# Patient Record
Sex: MALE | Race: WHITE | NOT HISPANIC OR LATINO | Employment: FULL TIME | URBAN - METROPOLITAN AREA
[De-identification: names, ages, dates, MRNs, and addresses within clinical notes are randomized per-mention and may not be internally consistent; named-entity substitution may affect disease eponyms.]

---

## 2022-11-22 ENCOUNTER — HOSPITAL ENCOUNTER (EMERGENCY)
Facility: HOSPITAL | Age: 37
Discharge: HOME | End: 2022-11-22
Attending: EMERGENCY MEDICINE

## 2022-11-22 VITALS
BODY MASS INDEX: 24.52 KG/M2 | HEART RATE: 100 BPM | OXYGEN SATURATION: 98 % | RESPIRATION RATE: 17 BRPM | WEIGHT: 185 LBS | DIASTOLIC BLOOD PRESSURE: 93 MMHG | HEIGHT: 73 IN | TEMPERATURE: 98.5 F | SYSTOLIC BLOOD PRESSURE: 143 MMHG

## 2022-11-22 DIAGNOSIS — R11.10 VOMITING: ICD-10-CM

## 2022-11-22 DIAGNOSIS — U07.1 COVID-19: ICD-10-CM

## 2022-11-22 DIAGNOSIS — J06.9 VIRAL URI WITH COUGH: Primary | ICD-10-CM

## 2022-11-22 LAB
FLUAV RNA RESP QL NAA+PROBE: NEGATIVE
FLUBV RNA RESP QL NAA+PROBE: NEGATIVE
RSV RNA RESP QL NAA+PROBE: NEGATIVE
SARS-COV-2 RNA RESP QL NAA+PROBE: POSITIVE

## 2022-11-22 RX ORDER — ONDANSETRON 4 MG/1
4 TABLET, ORALLY DISINTEGRATING ORAL ONCE
Status: COMPLETED | OUTPATIENT
Start: 2022-11-22 | End: 2022-11-22

## 2022-11-22 RX ORDER — IBUPROFEN 600 MG/1
600 TABLET ORAL ONCE
Status: COMPLETED | OUTPATIENT
Start: 2022-11-22 | End: 2022-11-22

## 2022-11-22 RX ADMIN — IBUPROFEN 600 MG: 600 TABLET, FILM COATED ORAL at 14:09

## 2022-11-22 RX ADMIN — ONDANSETRON 4 MG: 4 TABLET, ORALLY DISINTEGRATING ORAL at 14:10

## 2022-11-22 NOTE — ED PROVIDER NOTES
History  Chief Complaint   Patient presents with   • Cough     Reports dry heaving and coughing since last night  No meds taken  PMH: right inguinal hernia  no PSH  Presents to ED c/o 6 day h/o generalized malaise, myalgias, dry cough, congestion  Pt states sx started 11/17, 5 days ago and took off 17/18, and slept most of the weekend; then tried to return to work yesterday and had worsening coughing, non-bloody vomiting, tactile fever, chills  Today feels better, did tolerate some foods, ice cream, but wanted to be checked bc he missed several days of work  No urinary complaints, abd pain, flank pain,no ear pain, sore throat  NO meds PTA            None       History reviewed  No pertinent past medical history  History reviewed  No pertinent surgical history  History reviewed  No pertinent family history  I have reviewed and agree with the history as documented  E-Cigarette/Vaping   • E-Cigarette Use Never User      E-Cigarette/Vaping Substances     Social History     Tobacco Use   • Smoking status: Never   • Smokeless tobacco: Never   Vaping Use   • Vaping Use: Never used   Substance Use Topics   • Alcohol use: Yes     Alcohol/week: 2 0 standard drinks     Types: 2 Cans of beer per week   • Drug use: Yes     Types: Marijuana       Review of Systems   Constitutional: Positive for chills  Negative for fever  HENT: Positive for congestion and rhinorrhea  Negative for hearing loss, nosebleeds, sore throat and trouble swallowing  Eyes: Negative for visual disturbance  Respiratory: Positive for cough  Negative for shortness of breath  Cardiovascular: Negative for chest pain and leg swelling  Gastrointestinal: Positive for nausea and vomiting  Negative for abdominal pain  Genitourinary: Negative for dysuria and frequency  Musculoskeletal: Positive for myalgias  Negative for arthralgias  Skin: Negative for rash  Neurological: Positive for headaches   Negative for dizziness and weakness  Psychiatric/Behavioral: Negative for behavioral problems  All other systems reviewed and are negative  Physical Exam  Physical Exam  Vitals and nursing note reviewed  Constitutional:       General: He is not in acute distress  Appearance: Normal appearance  He is well-developed  HENT:      Head: Normocephalic and atraumatic  Right Ear: Tympanic membrane, ear canal and external ear normal       Left Ear: Tympanic membrane, ear canal and external ear normal       Nose: Congestion and rhinorrhea (Clear) present  Mouth/Throat:      Mouth: Mucous membranes are moist       Pharynx: Oropharynx is clear  No oropharyngeal exudate or posterior oropharyngeal erythema  Eyes:      Conjunctiva/sclera: Conjunctivae normal    Cardiovascular:      Rate and Rhythm: Normal rate and regular rhythm  Pulmonary:      Effort: Pulmonary effort is normal  No respiratory distress  Breath sounds: Normal breath sounds  No wheezing or rhonchi  Abdominal:      General: Bowel sounds are normal  There is no distension  Palpations: Abdomen is soft  Tenderness: There is no abdominal tenderness  Hernia: A hernia (non tender right inguinal) is present  Genitourinary:     Penis: Normal        Testes: Normal    Musculoskeletal:         General: Normal range of motion  Cervical back: Normal range of motion and neck supple  No tenderness  Right lower leg: No edema  Left lower leg: No edema  Lymphadenopathy:      Cervical: No cervical adenopathy  Skin:     General: Skin is warm and dry  Capillary Refill: Capillary refill takes less than 2 seconds  Findings: No rash  Neurological:      Mental Status: He is alert  Motor: No weakness     Psychiatric:         Behavior: Behavior normal          Vital Signs  ED Triage Vitals   Temperature Pulse Respirations Blood Pressure SpO2   11/22/22 1327 11/22/22 1326 11/22/22 1326 11/22/22 1326 11/22/22 1326   98 5 °F (36 9 °C) 100 17 143/93 98 %      Temp Source Heart Rate Source Patient Position - Orthostatic VS BP Location FiO2 (%)   11/22/22 1327 -- -- -- --   Oral          Pain Score       --                  Vitals:    11/22/22 1326   BP: 143/93   Pulse: 100         Visual Acuity      ED Medications  Medications   ondansetron (ZOFRAN-ODT) dispersible tablet 4 mg (4 mg Oral Given 11/22/22 1410)   ibuprofen (MOTRIN) tablet 600 mg (600 mg Oral Given 11/22/22 1409)       Diagnostic Studies  Results Reviewed     Procedure Component Value Units Date/Time    FLU/RSV/COVID - if FLU/RSV clinically relevant [[id number]]  (Abnormal) Collected: 11/22/22 1413    Lab Status: Final result Specimen: Nares from Nose Updated: 11/22/22 1506     SARS-CoV-2 Positive     INFLUENZA A PCR Negative     INFLUENZA B PCR Negative     RSV PCR Negative    Narrative:      FOR PEDIATRIC PATIENTS - copy/paste COVID Guidelines URL to browser: [email or web address]  [employer]    SARS-CoV-2 assay is a Nucleic Acid Amplification assay intended for the  qualitative detection of nucleic acid from SARS-CoV-2 in nasopharyngeal  swabs  Results are for the presumptive identification of SARS-CoV-2 RNA  Positive results are indicative of infection with SARS-CoV-2, the virus  causing COVID-19, but do not rule out bacterial infection or co-infection  with other viruses  Laboratories within the [place] and its  territories are required to report all positive results to the appropriate  public health authorities  Negative results do not preclude SARS-CoV-2  infection and should not be used as the sole basis for treatment or other  patient management decisions  Negative results must be combined with  clinical observations, patient history, and epidemiological information  This test has not been FDA cleared or approved  This test has been authorized by FDA under an Emergency Use Authorization  (EUA)   This test is only authorized for the duration of time the  declaration that circumstances exist justifying the authorization of the  emergency use of an in vitro diagnostic tests for detection of SARS-CoV-2  virus and/or diagnosis of COVID-19 infection under section 564(b)(1) of  the Act, 21 U  S C  880VIB-0(G)(5), unless the authorization is terminated  or revoked sooner  The test has been validated but independent review by FDA  and CLIA is pending  Test performed using [employer] Gene[employer]pert: This RT-PCR assay targets N2,  a region unique to SARS-CoV-2  A conserved region in the E-gene was chosen  for pan-Sarbecovirus detection which includes SARS-CoV-2  According to CMS-2020-01-R, this platform meets the definition of high-throughput technology  No orders to display              Procedures  Procedures         ED Course                                             MDM    Disposition  Final diagnoses:   Viral URI with cough   Vomiting   COVID-19     Time reflects when diagnosis was documented in both MDM as applicable and the Disposition within this note     Time User Action Codes Description Comment    11/22/2022  1:52 PM Sánchez Costa [J06 9] Viral URI with cough     11/22/2022  1:52 PM Ramon Quintana [R11 10] Vomiting     11/22/2022  3:47 PM Jenn Arias [address]  [U07 1] COVID-19       ED Disposition     ED Disposition   Discharge    Condition   Stable    Date/Time   Tue Nov 22, 2022  1:53 PM    Comment   Pearl Tello  discharge to home/self care  Follow-up Information     Follow up With Specialties Details Why Contact Info    Your PCP              There are no discharge medications for this patient  No discharge procedures on file      PDMP Review     None          ED Provider  Electronically Signed by           Jamie Dee PA-C  11/22/22 5204

## 2022-11-22 NOTE — Clinical Note
Almeta Bosworth was seen and treated in our emergency department on 11/22/2022  Diagnosis:     Memo Saldaña  may return to work on return date  He may return on this date: 11/23/2022    Pt tested for viral panel (COVID/RSV/Flu), pending at this time, but symptoms have been for 5 days, no fever here in ED, able to return to work     If you have any questions or concerns, please don't hesitate to call        Josie Patton PA-C    ______________________________           _______________          _______________  Hospital Representative                              Date                                Time

## 2022-11-22 NOTE — DISCHARGE INSTRUCTIONS
Use Tylenol every 4 hours or Motrin every 6 hours; you can alternate the 2 medications taking something every 3 hours for pain or fever  You can use over-the-counter antihistamines like Claritin, Zyrtec, Allegra with Flonase for nasal congestion symptoms  If no improvement follow-up with your doctor in next few days

## 2024-03-26 ENCOUNTER — HOSPITAL ENCOUNTER (EMERGENCY)
Facility: HOSPITAL | Age: 39
End: 2024-03-28
Attending: EMERGENCY MEDICINE | Admitting: EMERGENCY MEDICINE
Payer: COMMERCIAL

## 2024-03-26 DIAGNOSIS — F29 PSYCHOSIS (HCC): Primary | ICD-10-CM

## 2024-03-26 DIAGNOSIS — F22 DELUSIONS (HCC): ICD-10-CM

## 2024-03-26 DIAGNOSIS — F15.10 METHAMPHETAMINE USE (HCC): ICD-10-CM

## 2024-03-26 DIAGNOSIS — R46.89 AGGRESSIVE BEHAVIOR: ICD-10-CM

## 2024-03-26 PROCEDURE — 99285 EMERGENCY DEPT VISIT HI MDM: CPT | Performed by: EMERGENCY MEDICINE

## 2024-03-26 PROCEDURE — 96372 THER/PROPH/DIAG INJ SC/IM: CPT

## 2024-03-26 PROCEDURE — 99285 EMERGENCY DEPT VISIT HI MDM: CPT

## 2024-03-26 PROCEDURE — 82075 ASSAY OF BREATH ETHANOL: CPT | Performed by: EMERGENCY MEDICINE

## 2024-03-26 RX ORDER — OLANZAPINE 10 MG/2ML
INJECTION, POWDER, FOR SOLUTION INTRAMUSCULAR
Status: COMPLETED
Start: 2024-03-26 | End: 2024-03-26

## 2024-03-26 RX ORDER — LORAZEPAM 2 MG/ML
INJECTION INTRAMUSCULAR
Status: COMPLETED
Start: 2024-03-26 | End: 2024-03-26

## 2024-03-26 RX ORDER — WATER 10 ML/10ML
INJECTION INTRAMUSCULAR; INTRAVENOUS; SUBCUTANEOUS
Status: DISPENSED
Start: 2024-03-26 | End: 2024-03-27

## 2024-03-26 RX ORDER — OLANZAPINE 10 MG/2ML
10 INJECTION, POWDER, FOR SOLUTION INTRAMUSCULAR ONCE
Status: COMPLETED | OUTPATIENT
Start: 2024-03-26 | End: 2024-03-26

## 2024-03-26 RX ORDER — LORAZEPAM 2 MG/ML
2 INJECTION INTRAMUSCULAR ONCE
Status: COMPLETED | OUTPATIENT
Start: 2024-03-26 | End: 2024-03-26

## 2024-03-26 RX ADMIN — LORAZEPAM 2 MG: 2 INJECTION INTRAMUSCULAR at 16:39

## 2024-03-26 RX ADMIN — OLANZAPINE 10 MG: 10 INJECTION, POWDER, FOR SOLUTION INTRAMUSCULAR at 16:40

## 2024-03-26 RX ADMIN — LORAZEPAM 2 MG: 2 INJECTION INTRAMUSCULAR; INTRAVENOUS at 16:39
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
## 2024-03-26 NOTE — RESTRAINT FACE TO FACE
Restraint Face to Face   Karel Hairston Jr. 38 y.o. male MRN: [id number]  Unit/Bed#: SH 01 Encounter: [id number]      Physical Evaluation - Patient screaming at staff.  Not answering questions.  Accusing others of following him and threatening violence   Purpose for Restraints/ Seclusion High risk for causing significant disruption of treatment environment  and High risk for harm to others  Patient's reaction to the intervention - no improvement at this time.  Will continue to reassess  Patient's medical condition stable  Patient's Behavioral condition paranoid and aggressive  Restraints to be Continued

## 2024-03-27 VITALS
SYSTOLIC BLOOD PRESSURE: 140 MMHG | RESPIRATION RATE: 18 BRPM | TEMPERATURE: 97.8 F | OXYGEN SATURATION: 98 % | DIASTOLIC BLOOD PRESSURE: 80 MMHG | HEART RATE: 93 BPM

## 2024-03-27 LAB
AMPHETAMINES SERPL QL SCN: NEGATIVE
BARBITURATES UR QL: NEGATIVE
BENZODIAZ UR QL: NEGATIVE
COCAINE UR QL: NEGATIVE
ETHANOL EXG-MCNC: 0 MG/DL
FLUAV RNA RESP QL NAA+PROBE: NEGATIVE
FLUBV RNA RESP QL NAA+PROBE: NEGATIVE
METHADONE UR QL: NEGATIVE
OPIATES UR QL SCN: NEGATIVE
OXYCODONE+OXYMORPHONE UR QL SCN: NEGATIVE
PCP UR QL: NEGATIVE
RSV RNA RESP QL NAA+PROBE: NEGATIVE
SARS-COV-2 RNA RESP QL NAA+PROBE: NEGATIVE
THC UR QL: POSITIVE

## 2024-03-27 PROCEDURE — 0241U HB NFCT DS VIR RESP RNA 4 TRGT: CPT | Performed by: PHYSICIAN ASSISTANT

## 2024-03-27 PROCEDURE — 80307 DRUG TEST PRSMV CHEM ANLYZR: CPT | Performed by: EMERGENCY MEDICINE

## 2024-03-27 PROCEDURE — 96372 THER/PROPH/DIAG INJ SC/IM: CPT

## 2024-03-27 RX ORDER — WATER 10 ML/10ML
INJECTION INTRAMUSCULAR; INTRAVENOUS; SUBCUTANEOUS
Status: DISPENSED
Start: 2024-03-27 | End: 2024-03-27

## 2024-03-27 RX ORDER — OLANZAPINE 10 MG/2ML
10 INJECTION, POWDER, FOR SOLUTION INTRAMUSCULAR ONCE
Status: COMPLETED | OUTPATIENT
Start: 2024-03-27 | End: 2024-03-27

## 2024-03-27 RX ORDER — LORAZEPAM 2 MG/ML
2 INJECTION INTRAMUSCULAR ONCE
Status: COMPLETED | OUTPATIENT
Start: 2024-03-27 | End: 2024-03-27

## 2024-03-27 RX ADMIN — LORAZEPAM 2 MG: 2 INJECTION INTRAMUSCULAR; INTRAVENOUS at 00:48

## 2024-03-27 RX ADMIN — OLANZAPINE 10 MG: 10 INJECTION, POWDER, FOR SOLUTION INTRAMUSCULAR at 00:48
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
## 2024-03-27 NOTE — ED NOTES
Bed Search      Bed search was completed by Leta Clemente [facility].   She advised that clinical could be [facility] the following:  [facility]    Clinicals were faxed to the above.  Crisis to follow up.

## 2024-03-27 NOTE — RESTRAINT FACE TO FACE
Restraint Face to Face   [place] Marika Webb 38 y.o. male MRN: [id number]  Unit/Bed#: SH 01 Encounter: [id number]      Physical Evaluation Patient sleeping  Purpose for Restraints/ Seclusion High risk for harm to others  Patient's reaction to the interventionCalm  Patient's medical conditionstable  Patient's Behavioral condition improved  Restraints to be Terminated

## 2024-03-27 NOTE — ED CARE HANDOFF
"Emergency Department Sign Out Note        Sign out and transfer of care from Dr. Munguia. See Separate Emergency Department note.     The patient, Karel Hairston Jr., was evaluated by the previous provider for paranoia, possible alcohol intoxication, delusions.    Workup Completed:  Initial evaluation, medication, observation    ED Course / Workup Pending (followup):                                    ED Course as of 03/27/24 0728   Tue Mar 26, 2024   2357 SO: 38 y M. Used meth and drank alcohol. Paranoia and talked to police. Brought in and was agitated, needed Zyprexa and Ativan. Continue to monitor until he is awake and sober.   Wed Mar 27, 2024   0259 With respect to the patient's agitation, improved after medication.  Physical restraints de-escalated and gradually removed and at this time the patient is completely out of 4 point restraints.     Procedures  Medical Decision Making  The patient was initially seen by Dr. Munguia and I assumed care for the patient in signout at change of shift.  This is a 38-year-old male who initially presented brought in by police for evaluation of paranoia, delusions, and possible alcohol intoxication.  Per police report, the patient was reportedly wandering around the street and came directly to police voluntarily stating that he wanted to talk because he was scared that people were following him and they were going to \"report [him] for a crime that they framed [him] for.\"  He also admitted to police that he drank alcohol and used methamphetamine.  After arriving to the ED, the patient became uncooperative, expressing concern about being monitored by the [facility] and the government but otherwise refusing to answer direct questions, yelling and screaming at ED staff.  The patient became more aggressive and required physical restraints and pharmacological medications.  The patient continue to be observed in the ED and on reassessment by Dr. Munguia prior to change of shift, " "the patient awoke and reported that \"police are using wavelengths to get into my brain and controlled me like a puppet.\"  He also reports hearing voices of his parents as well voices that command him to do things.  He again became agitated, yelling and screaming at staff and escalating to physical violence.  This once again necessitated restraints and pharmacological intervention, patient received Ativan and Zyprexa IM.  Two-physician 302 initiated by Dr. Munguia and I completed the second portion of the documentation as the patient appears to have acute psychosis presenting a danger to himself and others.  After the patient was calm after receiving second dose of medication, I discussed with the patient and he reported drinking alcohol today but denies any methamphetamine use, last using approximately 2 weeks ago.  He still describes delusional beliefs including that the police are controlling his mind.  Crisis consult placed.  Care for the patient was signed out at the end of shift pending crisis evaluation and placement.    Amount and/or Complexity of Data Reviewed  Labs: ordered.    Risk  Prescription drug management.            Disposition  Final diagnoses:   Psychosis (HCC)   Methamphetamine use (HCC)   Delusions (HCC)   Aggressive behavior     Time reflects when diagnosis was documented in both MDM as applicable and the Disposition within this note       Time User Action Codes Description Comment    3/26/2024 11:55 PM Willie Munguia [F29] Psychosis (HCC)     3/26/2024 11:55 PM Willie Munguia [F15.10] Methamphetamine use (HCC)     3/27/2024  7:20 AM Didier Chiang [F22] Delusions (HCC)     3/27/2024  7:20 AM Didier Chiang [R46.89] Aggressive behavior           ED Disposition       ED Disposition   Transfer to Behavioral Health    Condition   --    Date/Time   Wed Mar 27, 2024  7:27 AM    Comment   Karel Hairston Jr. should be transferred out to Behavioral Health and has been " medically cleared.               Follow-up Information    None       Patient's Medications    No medications on file     No discharge procedures on file.       ED Provider  Electronically Signed by     Didier Chiang MD  03/27/24 0717

## 2024-03-27 NOTE — ED NOTES
"Pt was wondering if his mother was contacted after pt was brought into this ED. Pt was told no, you're an adult your mother does not need to be contacted. Pt then requested to use the phone to call his mother. This writer told the pt no not until you are calm and can speak in a respectful manner towards staff. Pt began to yell and scream at this write stating \"I'm going to kill you. You're a cunt. I'm going to kill your family.\"      Reena Ramos RN  03/27/24 0159    "

## 2024-03-27 NOTE — ED CARE HANDOFF
Emergency Department Sign Out Note        Sign out and transfer of care from my colleague. See Separate Emergency Department note.     The patient, Karel Hairston Jr., was [facility] the previous provider [facility]    Workup Completed:  Labs Reviewed   RAPID DRUG SCREEN, URINE - Abnormal       Result Value Ref Range Status    Amph/Meth UR Negative  Negative Final    Barbiturate Ur Negative  Negative Final    Benzodiazepine Urine Negative  Negative Final    Cocaine Urine Negative  Negative Final    Methadone Urine Negative  Negative Final    Opiate Urine Negative  Negative Final    PCP Ur Negative  Negative Final    THC Urine Positive (*) Negative Final    Oxycodone Urine Negative  Negative Final    Narrative:     Presumptive report. If requested, specimen will be sent to reference lab for confirmation.  FOR MEDICAL PURPOSES ONLY.   IF CONFIRMATION NEEDED PLEASE CONTACT THE LAB WITHIN 5 DAYS.    Drug Screen Cutoff Levels:  AMPHETAMINE/METHAMPHETAMINES  1000 ng/mL  BARBITURATES     200 ng/mL  BENZODIAZEPINES     200 ng/mL  COCAINE      300 ng/mL  METHADONE      300 ng/mL  OPIATES      300 ng/mL  PHENCYCLIDINE     25 ng/mL  THC       50 ng/mL  OXYCODONE      100 ng/mL   POCT ALCOHOL BREATH TEST - Normal    EXTBreath Alcohol 0.00   Final     No results found.      ED Course / Workup Pending (followup):    Patient received medical screening examination is medically for psychiatric hospitalization pending placement.                                     Procedures  Medical Decision Making  Amount and/or Complexity of Data Reviewed  Labs: ordered.    Risk  Prescription drug management.  Decision regarding hospitalization.            Disposition  Final diagnoses:   Psychosis (HCC)   Methamphetamine use (HCC)   Delusions (HCC)   Aggressive behavior     Time reflects when diagnosis was documented in both MDM as applicable and the Disposition within this note       Time  User Action Codes Description Comment    3/26/2024 11:55 PM Willie Munguia Add [F29] Psychosis ([facility])     3/26/2024 11:55 PM Willie Munguia Add [F15.10] Methamphetamine use ([facility])     3/27/2024  7:20 AM Mariia Didier Add [F22] Delusions ([facility])     3/27/2024  7:20 AM Didier Chiang Add [R46.89] Aggressive behavior           ED Disposition       ED Disposition   Transfer to Behavioral Health    Condition   --    Date/Time   Wed Mar 27, 2024  7:27 AM    Comment   Karel Hairston  should be transferred out to Behavioral Health and has been medically cleared.               MD Documentation      Flowsheet Row Most Recent Value   Sending MD Dr. Machado          Follow-up Information    None       Patient's Medications    No medications on file     No discharge procedures on file.       ED Provider  Electronically Signed by     Higinio Becerra DO  03/27/24 9130

## 2024-03-27 NOTE — RESTRAINT FACE TO FACE
Restraint Face to Face   Karel Hairston Jr. 38 y.o. male MRN: [id number]  Unit/Bed#:  01 Encounter: [id number]      Physical Evaluation: Agitated, aggressive  Purpose for Restraints/ Seclusion High risk for causing significant disruption of treatment environment , High risk for harm to others, and high risk for flight  Patient's reaction to the intervention: tolerating  Patient's medical condition: agitated and aggressive, otherwise stable  Patient's Behavioral condition: Acutely delusional, agitated, aggressive.  Believes the police are projecting thoughts into his mind and controlling him like appropriate.  Unable to verbally de-escalate and patient made threatening gestures to multiple staff members, necessitating physical restraints and pharmacological intervention  Restraints to be Continued

## 2024-03-27 NOTE — ED NOTES
Pt became agitated screaming why is he in the emergency department that he does not belong here, as well as police are the reason why he is here. Nurse attempt to deescalate situation multiple times with no success.  Provider was called to bedside along with security due to escalating  behavior and becoming verbal aggressive towards staff. Awaiting MD orders.     Angeline Jules RN  03/27/24 0149

## 2024-03-27 NOTE — ED NOTES
KESHAWN Redman notified that pt ETOH is 0.000 and he will be in to see pt          Chuy Hyde RN  03/27/24 0924

## 2024-03-27 NOTE — ED NOTES
Per Ed at [facility]- No appropriate beds at this time, patient could be re-referred in the morning if a bed is still needed.    Karel Reynoso  Crisis Intervention Specialist II  03/27/24

## 2024-03-27 NOTE — ED PROVIDER NOTES
History  Chief Complaint   Patient presents with    Alcohol Intoxication     Pt arrives via ems after being found wandering the streets.. Was belligerent and combative, arrives with PD in handcuffs. Pt rambling about the government and [place] during triage, refusing to answer questions. Pt does admit to drinking alcohol and occasionally using meth but denies doing so today      Patient was brought in the custody of police.  They report that he was wandering around the street and came up to them and told them that he wanted to talk to the police first because people are following him and he is worried that they were going to report him for a crime that they framed him for.  The police that he admitted to using methamphetamine and alcohol to them.  Patient is not cooperative with me now.  He does express concern about being monitored by the [facility] and the government.  He does not answer direct questions and is yelling and screaming.      History limited by:  Psychiatric disorder  Alcohol Intoxication      None       No past medical history on file.    No past surgical history on file.    No family history on file.  I have reviewed and agree with the history as documented.    E-Cigarette/Vaping    E-Cigarette Use Never User      E-Cigarette/Vaping Substances     Social History     Tobacco Use    Smoking status: Never    Smokeless tobacco: Never   Vaping Use    Vaping status: Never Used   Substance Use Topics    Alcohol use: Yes     Alcohol/week: 2.0 standard drinks of alcohol     Types: 2 Cans of beer per week    Drug use: Yes     Types: Marijuana       Review of Systems   All other systems reviewed and are negative.      Physical Exam  Physical Exam  Vitals and nursing note reviewed.   Constitutional:       General: He is not in acute distress.     Appearance: He is well-developed.   HENT:      Head: Normocephalic and atraumatic.   Eyes:      Conjunctiva/sclera: Conjunctivae normal.   Cardiovascular:      Rate and Rhythm:  Normal rate and regular rhythm.      Heart sounds: No murmur heard.  Pulmonary:      Effort: Pulmonary effort is normal. No respiratory distress.      Breath sounds: Normal breath sounds.   Abdominal:      Palpations: Abdomen is soft.      Tenderness: There is no abdominal tenderness.   Musculoskeletal:         General: No swelling.      Cervical back: Neck supple.   Skin:     General: Skin is warm and dry.      Capillary Refill: Capillary refill takes less than 2 seconds.   Neurological:      General: No focal deficit present.      Mental Status: He is alert.   Psychiatric:      Comments: Paranoid, agitated         Vital Signs  ED Triage Vitals [03/26/24 1704]   Temperature Pulse Respirations Blood Pressure SpO2   97.7 °F (36.5 °C) (!) 107 16 135/80 92 %      Temp Source Heart Rate Source Patient Position - Orthostatic VS BP Location FiO2 (%)   Oral Monitor Lying Right arm --      Pain Score       --           Vitals:    03/26/24 1704 03/27/24 0034   BP: 135/80 139/93   Pulse: (!) 107 102   Patient Position - Orthostatic VS: Lying Lying         Visual Acuity      ED Medications  Medications   sterile water injection **ADS Override Pull** (  Not Given 3/26/24 1725)   sterile water injection **ADS Override Pull** (has no administration in time range)   OLANZapine (ZyPREXA) IM injection 10 mg (10 mg Intramuscular Given 3/26/24 1640)   LORazepam (ATIVAN) injection 2 mg (2 mg Intramuscular Given 3/26/24 1639)   OLANZapine (ZyPREXA) IM injection 10 mg (10 mg Intramuscular Given 3/27/24 0048)   LORazepam (ATIVAN) injection 2 mg (2 mg Intramuscular Given 3/27/24 0048)       Diagnostic Studies  Results Reviewed       Procedure Component Value Units Date/Time    Rapid drug screen, urine [[id number]] Collected: 03/27/24 0041    Lab Status: In process Specimen: Urine, Clean Catch Updated: 03/27/24 0044    POCT alcohol breath test [[id number]]     Lab Status: No result                    No orders to display               Procedures  Procedures         ED Course  ED Course as of 03/27/24 0057   Tue Mar 26, 2024   1756 Patient fully out of restraints now.  Will evaluate again once patient awake.   2356 Case signed out to oncoming provider at change of shift pending re-evaluation when sedation has lessened.     Wed Mar 27, 2024   0037 Patient now awake.  He tells me he is being controlled like a puppet by people who project into his mind.  He tells me they force him to hear the voices of his parents who denigrate him.   0055 Patient reports hearing voices, commanding him to do things he cannot resist.  He believes the police are his enemy   I am concerned with his command hallucinations the may harm others.  He became sceaming and me and threatening nurses so 302 petition filled out for acute psychosis.   0057 Case signed out to oncoming provider at change of shift pending crisis.                                               Medical Decision Making  I evaluated the patient.  He is paranoid with psychosis.  He is unable to provide detailed history due to this but history provided by police is concerning for methamphetamine intoxication versus psychiatric disorder.  Patient needs to be medicated for safety of self and staff at this time and then will reevaluate the patient.    Amount and/or Complexity of Data Reviewed  Independent Historian:      Details: Police officers due to psychosis  Labs: ordered.    Risk  Prescription drug management.             Disposition  Final diagnoses:   Psychosis (HCC)   Methamphetamine use (HCC)     Time reflects when diagnosis was documented in both MDM as applicable and the Disposition within this note       Time User Action Codes Description Comment    3/26/2024 11:55 PM Willie Munguia Add [F29] Psychosis (HCC)     3/26/2024 11:55 PM Willie Munguia Add [F15.10] Methamphetamine use (HCC)           ED Disposition       None          Follow-up Information    None         Patient's Medications     No medications on file       No discharge procedures on file.    PDMP Review       None            ED Provider  Electronically Signed by             Willie Munguia MD  03/27/24 0021       Willie Munguia MD  03/27/24 0057

## 2024-03-27 NOTE — ED NOTES
Insurance Authorization for admission:   Phone call placed to [facility].  Phone number: [phone].     Spoke to Jeny     5 days approved.  Level of care: AIP  Review on TBD.   Authorization # accepting facility to call on arrival.        Eligibility Verification System checked - ([phone]).  Online system / automated system indicates: [facility] ID#[id number]    Insurance Authorization for Transportation:    TBD

## 2024-03-27 NOTE — ED NOTES
Pt calm and cooperative at this time. Speaking with family on the phone.      Vicki Valadez RN  03/27/24 1943

## 2024-03-28 NOTE — ED NOTES
Pt transported to [facility] via [facility]. Belongings removed from cabinet and given to EMS     Nisa Laws RN  03/28/24 0806

## 2024-03-28 NOTE — ED NOTES
Patient is accepted at [facility]  Patient is accepted by Dr. Templeton per [facility].     Transportation is arranged with [facility].     Transportation is scheduled for **. TBD  Patient may go to the floor anytime          Nurse report is to be called to ** prior to patient transfer.

## 2024-03-28 NOTE — EMTALA/ACUTE CARE TRANSFER
[facility] EMERGENCY DEPARTMENT  [address] [place] [id number]  Dept: [phone]      EMTALA TRANSFER CONSENT    NAME Karel Hairston Jr.                                         1985                              MRN [id number]    I have been informed of my rights regarding examination, treatment, and transfer   by Dr. Higinio Becerra, DO    Benefits: Specialized equipment and/or services available at the receiving facility (Include comment)________________________ (Psych)    Risks: Potential for delay in receiving treatment, Potential deterioration of medical condition, Increased discomfort during transfer      Consent for Transfer:  I acknowledge that my medical condition has been evaluated and explained to me by the emergency department physician or other qualified medical person and/or my attending physician, who has recommended that I be transferred to the service of  Accepting Physician: Dr. Templeton at Accepting Facility Name, City & State : [place]. The above potential benefits of such transfer, the potential risks associated with such transfer, and the probable risks of not being transferred have been explained to me, and I fully understand them.  The doctor has explained that, in my case, the benefits of transfer outweigh the risks.  I agree to be transferred.    I authorize the performance of emergency medical procedures and treatments upon me in both transit and upon arrival at the receiving facility.  Additionally, I authorize the release of any and all medical records to the receiving facility and request they be transported with me, if possible.  I understand that the safest mode of transportation during a medical emergency is an ambulance and that the Hospital advocates the use of this mode of transport. Risks of traveling to the receiving facility by car, including absence of medical control, life sustaining equipment, such as oxygen, and medical personnel  has been explained to me and I fully understand them.    (SHILPI CORRECT BOX BELOW)  [  ]  I consent to the stated transfer and to be transported by ambulance/helicopter.  [  ]  I consent to the stated transfer, but refuse transportation by ambulance and accept full responsibility for my transportation by car.  I understand the risks of non-ambulance transfers and I exonerate the Hospital and its staff from any deterioration in my condition that results from this refusal.    X___________________________________________    DATE  24  TIME________  Signature of patient or legally responsible individual signing on patient behalf           RELATIONSHIP TO PATIENT_________________________          Provider Certification    NAME Karel Hairston Jr.                                         1985                              MRN [id number]    A medical screening exam was performed on the above named patient.  Based on the examination:    Condition Necessitating Transfer The primary encounter diagnosis was Psychosis (HCC). Diagnoses of Methamphetamine use (HCC), Delusions (HCC), and Aggressive behavior were also pertinent to this visit.    Patient Condition: The patient has been stabilized such that within reasonable medical probability, no material deterioration of the patient condition or the condition of the unborn child(bella) is likely to result from the transfer    Reason for Transfer: Level of Care needed not available at this facility (Psych)    Transfer Requirements: Facility [facility]   Space available and qualified personnel available for treatment as acknowledged by    Agreed to accept transfer and to provide appropriate medical treatment as acknowledged by       Dr. Templeton  Appropriate medical records of the examination and treatment of the patient are provided at the time of transfer   STAFF INITIAL WHEN COMPLETED _______  Transfer will be performed by qualified personnel from    and appropriate  transfer equipment as required, including the use of necessary and appropriate life support measures.    Provider Certification: I have examined the patient and explained the following risks and benefits of being transferred/refusing transfer to the patient/family:  General risk, such as traffic hazards, adverse weather conditions, rough terrain or turbulence, possible failure of equipment (including vehicle or aircraft), or consequences of actions of persons outside the control of the transport personnel, Unanticipated needs of medical equipment and personnel during transport, Risk of worsening condition, The possibility of a transport vehicle being unavailable      Based on these reasonable risks and benefits to the patient and/or the unborn child(bella), and based upon the information available at the time of the patient’s examination, I certify that the medical benefits reasonably to be expected from the provision of appropriate medical treatments at another medical facility outweigh the increasing risks, if any, to the individual’s medical condition, and in the case of labor to the unborn child, from effecting the transfer.    X____________________________________________ DATE 03/28/24        TIME_______      ORIGINAL - SEND TO MEDICAL RECORDS   COPY - SEND WITH PATIENT DURING TRANSFER

## 2024-03-28 NOTE — ED NOTES
Chart reviewed. Accepted at [facility].  Handoff reads 0700 transport. No transport time in the chart.  [facility] checked - [facility] BLS@0700.  [facility] updated of transport time and that pt remains in the ER. S/w [facility] with admissions.  RN report prior to transport is not necessary unless clinical changes occur or medications given ([phone].

## 2024-05-11 ENCOUNTER — HOSPITAL ENCOUNTER (EMERGENCY)
Facility: HOSPITAL | Age: 39
Discharge: HOME | End: 2024-05-11
Attending: EMERGENCY MEDICINE
Payer: COMMERCIAL

## 2024-05-11 VITALS
DIASTOLIC BLOOD PRESSURE: 88 MMHG | HEART RATE: 100 BPM | OXYGEN SATURATION: 96 % | RESPIRATION RATE: 18 BRPM | SYSTOLIC BLOOD PRESSURE: 144 MMHG | TEMPERATURE: 97.5 F

## 2024-05-11 DIAGNOSIS — H10.9 CONJUNCTIVITIS: Primary | ICD-10-CM

## 2024-05-11 DIAGNOSIS — Z75.8 DOES NOT HAVE PRIMARY CARE PROVIDER: ICD-10-CM

## 2024-05-11 PROCEDURE — 99282 EMERGENCY DEPT VISIT SF MDM: CPT

## 2024-05-11 PROCEDURE — 99284 EMERGENCY DEPT VISIT MOD MDM: CPT | Performed by: EMERGENCY MEDICINE

## 2024-05-11 RX ORDER — TETRACAINE HYDROCHLORIDE 5 MG/ML
1 SOLUTION OPHTHALMIC ONCE
Status: COMPLETED | OUTPATIENT
Start: 2024-05-11 | End: 2024-05-11

## 2024-05-11 RX ORDER — POLYMYXIN B SULFATE AND TRIMETHOPRIM 1; 10000 MG/ML; [USP'U]/ML
1 SOLUTION OPHTHALMIC EVERY 4 HOURS
Qty: 10 ML | Refills: 0 | Status: SHIPPED | OUTPATIENT
Start: 2024-05-11

## 2024-05-11 RX ADMIN — TETRACAINE HYDROCHLORIDE 1 DROP: 5 SOLUTION OPHTHALMIC at 18:06

## 2024-05-11 RX ADMIN — FLUORESCEIN SODIUM 1 STRIP: 1 STRIP OPHTHALMIC at 18:06

## 2024-05-11 NOTE — DISCHARGE INSTRUCTIONS
Conjunctivitis   WHAT YOU SHOULD KNOW:   Conjunctivitis, or pink eye, is inflammation of your conjunctiva. The conjunctiva is a thin tissue that covers the front of your eye and the back of your eyelids. The conjunctiva helps protect your eye and keep it moist.         eye damage. Write down your questions so you remember to ask them during your visits.  Avoid the spread of conjunctivitis:   Wash your hands often:  Wash your hands before you touch your eyes. Also wash your hands before you prepare or eat food and after you use the bathroom or change a diaper.    Avoid allergens:  Try to avoid the things that cause your allergies, such as pets, dust, or grass.     Avoid contact:  Do not share towels or washcloths. Try to stay away from others as much as possible. Ask when you can return to work or school.     Throw away eye makeup:  Throw away mascara and other eye makeup.  Manage your symptoms:  Apply a cool compress:  Wet a washcloth with cold water and place it on your eye. This will help decrease swelling.    Use eye drops:  Eye drops, or artificial tears, can be bought without a doctor's order. They help keep your eye moist.    Do not wear contact lenses:  They can irritate your eye. Throw away the pair you are using and ask when you can wear them again. Use a new pair of lenses when your primary healthcare provider says it is okay.     Flush your eye:  You may need to flush your eye with saline to help decrease your symptoms. Ask for more information on how to flush your eye.  Contact your ophthalmologist or primary care provider if:   Your eyesight becomes blurry.    You have tiny bumps or spots of blood on your eye.    You have questions or concerns about your condition or care.  Return to the emergency department if:   The swelling in your eye gets worse, even after treatment.     Your vision suddenly becomes worse or you cannot see at all.    Your eye begins to bleed.

## 2024-05-11 NOTE — ED PROVIDER NOTES
History  Chief Complaint   Patient presents with    Eye Pain     Patient woke up this morning and felt as if something was in his eye. Patient then began with yellow drainage. Concerned for pink eye. Reports blurred vision due to the drainage.     Medical Problem     Patient went to donate blood two weeks ago. The blood was tested and came back as to having been positive for either Hepatitis B, Hepatitis C, or HIV. The letter sent from the blood bank did not specify as to which marker was positive. Patient denies drug use or same sex partners.      39 yo M woke up this morning with a feeling like his left eye was scratchy, or gritty, so he was rubbing it, and over the course of the day it became more red, itchy, scratchy, with thick discharge and his eyelid is swelling.  He is not aware of getting anything in it, but he has started a new job as a  and says particulate matter is often flying around.  He was also concerned about screening testing he received from the blood bank when he tried to give blood but was disqualified due to a positive viral screening, that he thinks included HBV, HCV, or HIV.  He's doesn't what was positive and is asking for testing.            None       Past Medical History:   Diagnosis Date    Bipolar disorder (HCC)     Schizophrenia (HCC)        No past surgical history on file.    No family history on file.  I have reviewed and agree with the history as documented.    E-Cigarette/Vaping    E-Cigarette Use Never User      E-Cigarette/Vaping Substances     Social History     Tobacco Use    Smoking status: Never    Smokeless tobacco: Never   Vaping Use    Vaping status: Never Used   Substance Use Topics    Alcohol use: Yes     Alcohol/week: 2.0 standard drinks of alcohol     Types: 2 Cans of beer per week    Drug use: Yes     Types: Marijuana       Review of Systems    Physical Exam  Physical Exam  Vitals and nursing note reviewed.   Constitutional:       Appearance: He is  well-developed.   HENT:      Head: Normocephalic and atraumatic.      Right Ear: External ear normal.      Left Ear: External ear normal.      Nose: Nose normal.      Mouth/Throat:      Mouth: Mucous membranes are moist.   Eyes:      General: Lids are everted, no foreign bodies appreciated. No visual field deficit.        Right eye: No discharge.         Left eye: Discharge present.     Extraocular Movements:      Right eye: Normal extraocular motion and no nystagmus.      Left eye: Normal extraocular motion.      Conjunctiva/sclera:      Left eye: Left conjunctiva is injected. Exudate present.      Pupils: Pupils are equal, round, and reactive to light.      Right eye: Pupil is round, reactive and not sluggish.      Left eye: Pupil is round, reactive and not sluggish. No corneal abrasion or fluorescein uptake.      Slit lamp exam:     Left eye: No corneal flare, corneal ulcer or foreign body.   Cardiovascular:      Rate and Rhythm: Normal rate.   Pulmonary:      Effort: Pulmonary effort is normal.   Abdominal:      Tenderness: There is no abdominal tenderness.   Musculoskeletal:         General: Normal range of motion.      Cervical back: Normal range of motion and neck supple.   Skin:     General: Skin is warm and dry.      Capillary Refill: Capillary refill takes less than 2 seconds.   Neurological:      Mental Status: He is alert and oriented to person, place, and time.      Cranial Nerves: No cranial nerve deficit.      Coordination: Coordination normal.   Psychiatric:         Behavior: Behavior normal.         Thought Content: Thought content normal.         Judgment: Judgment normal.         Vital Signs  ED Triage Vitals [05/11/24 1729]   Temperature Pulse Respirations Blood Pressure SpO2   97.5 °F (36.4 °C) 100 18 144/88 96 %      Temp Source Heart Rate Source Patient Position - Orthostatic VS BP Location FiO2 (%)   Oral Monitor Sitting Right arm --      Pain Score       8           Vitals:    05/11/24 1729    BP: 144/88   Pulse: 100   Patient Position - Orthostatic VS: Sitting         Visual Acuity      ED Medications  Medications   fluorescein sodium sterile ophthalmic strip 1 strip (1 strip Left Eye Given 5/11/24 1806)   tetracaine 0.5 % ophthalmic solution 1 drop (1 drop Both Eyes Given 5/11/24 1806)       Diagnostic Studies  Results Reviewed       None                   No orders to display              Procedures  Procedures         ED Course                                             Medical Decision Making  I referred him to [facility] for the screening testing, and he has recently acquired Medicaid, so he can select primary care as well.      Risk  Prescription drug management.             Disposition  Final diagnoses:   Conjunctivitis   Does not have primary care provider     Time reflects when diagnosis was documented in both MDM as applicable and the Disposition within this note       Time User Action Codes Description Comment    5/11/2024  5:52 PM Feroz Blake Add [H10.9] Conjunctivitis     5/11/2024  5:54 PM Feroz Blake Add [Z75.8] Does not have primary care provider           ED Disposition       ED Disposition   Discharge    Condition   Good    Date/Time   Sat May 11, 2024  5:51 PM    Comment   Karel Hairston Jr. discharge to home/self care.                   Follow-up Information       Follow up With Specialties Details Why Contact Info Additional Information    [facility]  Call  For followup with the screening tests you are concerned about [address] [id number] [phone]     [facility] Schedule an appointment as soon as possible for a visit  For followup [address] [place] [id number] [phone] [facility], [address] [place] [address], [place], [place], [address] [phone]            Discharge Medication List  as of 5/11/2024  5:55 PM        START taking these medications    Details   polymyxin b-trimethoprim (POLYTRIM) ophthalmic solution Administer 1 drop to the right eye every 4 (four) hours, Starting Sat 5/11/2024, Normal                 PDMP Review       None            ED Provider  Electronically Signed by             Feroz Blake MD  05/11/24 1922

## 2024-05-15 ENCOUNTER — TELEPHONE (OUTPATIENT)
Dept: PSYCHIATRY | Facility: CLINIC | Age: 39
End: 2024-05-15

## 2024-05-15 NOTE — TELEPHONE ENCOUNTER
Patient called the office and left a voicemail seeking his Medical Records from [facility]. Writer was unsure of how patient could obtain those from that provider. Writer provided the MRO ph#[phone]

## 2024-05-16 ENCOUNTER — TELEPHONE (OUTPATIENT)
Dept: PSYCHIATRY | Facility: CLINIC | Age: 39
End: 2024-05-16

## 2024-05-16 NOTE — TELEPHONE ENCOUNTER
Patient has been added to the Medication Management wait list without a referral.    Insurance: [facility] for you  Insurance Type:    Commercial []   Medicaid [x]   [facility] (if applicable)   Medicare []  Location Preference: [place]  Provider Preference: no pref  Virtual: Yes [] No [x]  Were outside resources sent: Yes [x] No []

## 2024-05-20 NOTE — TELEPHONE ENCOUNTER
Patient called office requesting an appointment. Writer informed patient he is still on the wait list and intake will contact him once there is an opening.

## 2024-05-22 ENCOUNTER — HOSPITAL ENCOUNTER (EMERGENCY)
Facility: HOSPITAL | Age: 39
Discharge: HOME | End: 2024-05-22
Attending: EMERGENCY MEDICINE | Admitting: EMERGENCY MEDICINE
Payer: COMMERCIAL

## 2024-05-22 VITALS
DIASTOLIC BLOOD PRESSURE: 76 MMHG | WEIGHT: 190.7 LBS | HEART RATE: 97 BPM | HEIGHT: 73 IN | SYSTOLIC BLOOD PRESSURE: 127 MMHG | BODY MASS INDEX: 25.27 KG/M2 | OXYGEN SATURATION: 96 % | TEMPERATURE: 97.9 F | RESPIRATION RATE: 18 BRPM

## 2024-05-22 DIAGNOSIS — F20.9 SCHIZOPHRENIA (HCC): ICD-10-CM

## 2024-05-22 DIAGNOSIS — F31.9 BIPOLAR DISORDER (HCC): Primary | ICD-10-CM

## 2024-05-22 PROCEDURE — 99281 EMR DPT VST MAYX REQ PHY/QHP: CPT

## 2024-05-22 PROCEDURE — 99284 EMERGENCY DEPT VISIT MOD MDM: CPT | Performed by: EMERGENCY MEDICINE

## 2024-05-22 RX ORDER — DIVALPROEX SODIUM 500 MG/1
500 TABLET, DELAYED RELEASE ORAL 2 TIMES DAILY
COMMUNITY
Start: 2024-04-22

## 2024-05-22 RX ORDER — RISPERIDONE 2 MG/1
2 TABLET ORAL 2 TIMES DAILY
Qty: 180 TABLET | Refills: 0 | Status: SHIPPED | OUTPATIENT
Start: 2024-05-22

## 2024-05-22 RX ORDER — DIVALPROEX SODIUM 500 MG/1
500 TABLET, DELAYED RELEASE ORAL EVERY 12 HOURS SCHEDULED
Qty: 180 TABLET | Refills: 0 | Status: SHIPPED | OUTPATIENT
Start: 2024-05-22

## 2024-05-22 NOTE — ED PROVIDER NOTES
History  Chief Complaint   Patient presents with    Medication Refill     Pt reports ran out of Depakote and Invega(Paliperidone). On waiting list for [facility], so unable to get refill.     Patient is a 38-year-old male.  He has a history of bipolar disorder and schizophrenia.  He was recently hospitalized inpatient for mental health.  He was discharged with 1 months worth of medication.  He currently is due for his Invega shot.  He is also out of his Depakote.  He is not suicidal.  He denies hallucinations.        Prior to Admission Medications   Prescriptions Last Dose Informant Patient Reported? Taking?   divalproex sodium (DEPAKOTE) 500 mg DR tablet   Yes Yes   Sig: Take 500 mg by mouth 2 (two) times a day   polymyxin b-trimethoprim (POLYTRIM) ophthalmic solution   No No   Sig: Administer 1 drop to the right eye every 4 (four) hours      Facility-Administered Medications: None       Past Medical History:   Diagnosis Date    Bipolar disorder (HCC)     Schizophrenia (HCC)        History reviewed. No pertinent surgical history.    History reviewed. No pertinent family history.  I have reviewed and agree with the history as documented.    E-Cigarette/Vaping    E-Cigarette Use Never User      E-Cigarette/Vaping Substances     Social History     Tobacco Use    Smoking status: Every Day     Types: Cigarettes    Smokeless tobacco: Never   Vaping Use    Vaping status: Never Used   Substance Use Topics    Alcohol use: Not Currently     Alcohol/week: 2.0 standard drinks of alcohol     Types: 2 Cans of beer per week    Drug use: Not Currently     Types: Marijuana       Review of Systems   Psychiatric/Behavioral:  Negative for hallucinations and suicidal ideas.    All other systems reviewed and are negative.      Physical Exam  Physical Exam  Vitals reviewed.   Constitutional:       General: He is not in acute distress.  HENT:      Head: Normocephalic and atraumatic.      Nose: Nose normal.      Mouth/Throat:       Mouth: Mucous membranes are moist.   Eyes:      General:         Right eye: No discharge.         Left eye: No discharge.      Conjunctiva/sclera: Conjunctivae normal.   Pulmonary:      Effort: Pulmonary effort is normal. No respiratory distress.   Musculoskeletal:         General: No deformity or signs of injury.      Cervical back: Normal range of motion and neck supple.   Skin:     General: Skin is warm and dry.      Coloration: Skin is not pale.   Neurological:      General: No focal deficit present.      Mental Status: He is alert and oriented to person, place, and time.   Psychiatric:         Mood and Affect: Mood normal.         Behavior: Behavior normal.         Vital Signs  ED Triage Vitals [05/22/24 1306]   Temperature Pulse Respirations Blood Pressure SpO2   97.9 °F (36.6 °C) 97 18 127/76 96 %      Temp Source Heart Rate Source Patient Position - Orthostatic VS BP Location FiO2 (%)   Oral Monitor Sitting Right arm --      Pain Score       --           Vitals:    05/22/24 1306   BP: 127/76   Pulse: 97   Patient Position - Orthostatic VS: Sitting         Visual Acuity      ED Medications  Medications - No data to display      Diagnostic Studies  Results Reviewed       None                   No orders to display              Procedures  Procedures         ED Course                               SBIRT 20yo+      Flowsheet Row Most Recent Value   Initial Alcohol Screen: US AUDIT-C     1. How often do you have a drink containing alcohol? 0 Filed at: 05/22/2024 1308   2. How many drinks containing alcohol do you have on a typical day you are drinking?  0 Filed at: 05/22/2024 1308   3a. Male UNDER 65: How often do you have five or more drinks on one occasion? 0 Filed at: 05/22/2024 1308   Audit-C Score 0 Filed at: 05/22/2024 1308   RAY: How many times in the past year have you...    Used an illegal drug or used a prescription medication for non-medical reasons? Never Filed at: 05/22/2024 1308                       Medical Decision Making  Not suicidal.  Not psychotic.  Appropriate for discharge and outpatient management.  Invega not on formulary here.  Patient had been on Risperdal before Invega.  Will restart Risperdal.  Will refill Depakote.  Awaiting psychiatry appointment.  Appropriate for discharge and continued outpatient management.    Risk  Prescription drug management.  Decision regarding hospitalization.             Disposition  Final diagnoses:   Bipolar disorder (HCC)   Schizophrenia (HCC)     Time reflects when diagnosis was documented in both MDM as applicable and the Disposition within this note       Time User Action Codes Description Comment    5/22/2024  1:43 PM Donovan Presley [F31.9] Bipolar disorder (HCC)     5/22/2024  1:43 PM Donovan Presley [F20.9] Schizophrenia (HCC)           ED Disposition       ED Disposition   Discharge    Condition   Stable    Date/Time   Wed May 22, 2024 1343    Comment   Karel Hairston Jr. discharge to home/self care.                   Follow-up Information       Follow up With Specialties Details Why Contact [facility] Family [facility] In 1 week  [address] [facility] [address]  [phone]      Follow-up with psychiatry soon as possible                Patient's Medications   Discharge Prescriptions    DIVALPROEX SODIUM (DEPAKOTE) 500 MG DR TABLET    Take 1 tablet (500 mg total) by mouth every 12 (twelve) hours       Start Date: 5/22/2024 End Date: --       Order Dose: 500 mg       Quantity: 180 tablet    Refills: 0    RISPERIDONE (RISPERDAL) 2 MG TABLET    Take 1 tablet (2 mg total) by mouth 2 (two) times a day       Start Date: 5/22/2024 End Date: --       Order Dose: 2 mg       Quantity: 180 tablet    Refills: 0       No discharge procedures on file.    PDMP Review       None            ED Provider  Electronically Signed by             Donovan Presley MD  05/22/24 8266       Donovan Presley,  MD  05/22/24 8238

## 2024-06-26 ENCOUNTER — OFFICE VISIT (OUTPATIENT)
Dept: INTERNAL MEDICINE CLINIC | Facility: CLINIC | Age: 39
End: 2024-06-26
Payer: COMMERCIAL

## 2024-06-26 VITALS
BODY MASS INDEX: 25.76 KG/M2 | TEMPERATURE: 97.7 F | HEART RATE: 102 BPM | DIASTOLIC BLOOD PRESSURE: 76 MMHG | OXYGEN SATURATION: 96 % | HEIGHT: 73 IN | WEIGHT: 194.4 LBS | SYSTOLIC BLOOD PRESSURE: 122 MMHG

## 2024-06-26 DIAGNOSIS — B19.20 HEPATITIS C VIRUS INFECTION WITHOUT HEPATIC COMA, UNSPECIFIED CHRONICITY: Primary | ICD-10-CM

## 2024-06-26 DIAGNOSIS — Z13.220 LIPID SCREENING: ICD-10-CM

## 2024-06-26 DIAGNOSIS — F20.9 SCHIZOPHRENIA, UNSPECIFIED TYPE (HCC): ICD-10-CM

## 2024-06-26 DIAGNOSIS — Z11.4 SCREENING FOR HIV (HUMAN IMMUNODEFICIENCY VIRUS): ICD-10-CM

## 2024-06-26 DIAGNOSIS — Z76.89 ESTABLISHING CARE WITH NEW DOCTOR, ENCOUNTER FOR: ICD-10-CM

## 2024-06-26 DIAGNOSIS — Z11.59 NEED FOR HEPATITIS C SCREENING TEST: ICD-10-CM

## 2024-06-26 DIAGNOSIS — R60.9 SOFT TISSUE SWELLING OF BACK: ICD-10-CM

## 2024-06-26 PROCEDURE — 99203 OFFICE O/P NEW LOW 30 MIN: CPT | Performed by: INTERNAL MEDICINE

## 2024-06-26 NOTE — PROGRESS NOTES
Assessment/Plan:    Diagnoses and all orders for this visit:    Hepatitis C virus infection without hepatic coma, unspecified chronicity  Comments:  Detected while in screening for blood donor.  Will refer to gastroenterology.  Denies IVDU, but has historical data of meth use  Orders:  -     Ambulatory Referral to Gastroenterology; Future  -     Lipid panel; Future  -     CBC (Includes Diff/Plt) (Refl); Future  -     Lipid panel; Future    Lipid screening  -     Lipid panel; Future  -     CBC (Includes Diff/Plt) (Refl); Future  -     Lipid panel; Future    Establishing care with new doctor, encounter for    Schizophrenia, unspecified type (HCC)  Comments:  New diagnosis, on risperidone and Depakote, due for follow-up with the psychiatry today.  Orders:  -     Lipid panel; Future  -     CBC (Includes Diff/Plt) (Refl); Future    Need for hepatitis C screening test  -     Hepatitis C Antibody; Future    Screening for HIV (human immunodeficiency virus)  -     HIV 1/2 AG/AB w Reflex [facility] for 2 yr old and above; Future    Soft tissue swelling of back  Comments:  Close to the right shoulder joint.  Likely lipoma, patient reports ultrasound about the year ago, will bring reports.              There are no Patient Instructions on file for this visit.    Subjective:      Patient ID: Karel Hairston Jr. is a 38 y.o. male    HPI      Current Outpatient Medications:     divalproex sodium (DEPAKOTE) 500 mg DR tablet, Take 500 mg by mouth 2 (two) times a day, Disp: , Rfl:     risperiDONE (RisperDAL) 2 mg tablet, Take 1 tablet (2 mg total) by mouth 2 (two) times a day, Disp: 180 tablet, Rfl: 0    divalproex sodium (DEPAKOTE) 500 mg DR tablet, Take 1 tablet (500 mg total) by mouth every 12 (twelve) hours (Patient not taking: Reported on 6/26/2024), Disp: 180 tablet, Rfl: 0    polymyxin b-trimethoprim (POLYTRIM) ophthalmic solution, Administer 1 drop to the right eye every 4 (four) hours (Patient not taking: Reported on  6/26/2024), Disp: 10 mL, Rfl: 0     Past Medical History:   Diagnosis Date    Anxiety     Bipolar disorder (HCC)     Schizophrenia (HCC)          History reviewed. No pertinent surgical history.      Allergies   Allergen Reactions    Sulfa Antibiotics Other (See Comments)     Unknown       Recent Results (from the past 1008 hour(s))   COMPREHENSIVE METABOLIC PANEL    Collection Time: 05/24/24  1:06 PM   Result Value Ref Range    Glucose 116 (H) 65 - 99 mg/dL    BUN 6 (L) 7 - 28 mg/dL    Creatinine 0.85 0.53 - 1.30 mg/dL    Sodium 141 135 - 145 mmol/L    Potassium 3.8 3.5 - 5.2 mmol/L    Chloride 106 100 - 109 mmol/L    Carbon Dioxide 27 21 - 31 mmol/L    Calcium 8.8 8.5 - 10.1 mg/dL    Alkaline Phosphatase 55 35 - 120 U/L    ALBUMIN 4.1 3.5 - 5.7 g/dL    Total Bilirubin 0.3 0.2 - 1.0 mg/dL    Protein, Total 6.9 6.3 - 8.3 g/dL    AST 19 <41 U/L    ALT 19 <56 U/L    ANION GAP 8 3 - 11    eGFRcr 114 >59    eGFR Comment Interpretive information: calculated GFR        The following portions of the patient's history were reviewed and updated as appropriate: allergies, current medications, past family history, past medical history, past social history, past surgical history and problem list.     Review of Systems   Constitutional:  Negative for activity change, appetite change, chills, diaphoresis, fatigue, fever and unexpected weight change.   HENT:  Negative for congestion and sore throat.    Respiratory:  Negative for apnea, cough, choking, chest tightness, shortness of breath, wheezing and stridor.    Cardiovascular:  Negative for chest pain, palpitations and leg swelling.   Gastrointestinal:  Negative for abdominal distention, abdominal pain, blood in stool, constipation, nausea and rectal pain.   Genitourinary:  Negative for dysuria, flank pain, frequency and urgency.   Musculoskeletal:  Negative for arthralgias, back pain, gait problem, joint swelling and myalgias.   Skin:  Negative for color change, pallor and  rash.   Neurological:  Negative for headaches.         Objective:      Vitals:    06/26/24 1100   BP: 122/76   Pulse: 102   Temp: 97.7 °F (36.5 °C)   SpO2: 96%          Physical Exam  Vitals reviewed.   Constitutional:       General: He is not in acute distress.     Appearance: Normal appearance. He is not ill-appearing, toxic-appearing or diaphoretic.   HENT:      Mouth/Throat:      Mouth: Mucous membranes are moist.   Cardiovascular:      Rate and Rhythm: Normal rate and regular rhythm.      Pulses: Normal pulses.      Heart sounds: Normal heart sounds. No murmur heard.     No friction rub. No gallop.   Pulmonary:      Effort: Pulmonary effort is normal. No respiratory distress.      Breath sounds: Normal breath sounds. No stridor. No wheezing, rhonchi or rales.   Chest:      Chest wall: No tenderness.   Musculoskeletal:      Right lower leg: No edema.      Left lower leg: No edema.   Skin:     General: Skin is warm and dry.      Findings: No lesion or rash.      Comments: 5 cm mobile and soft swelling on the back of the right shoulder   Neurological:      Mental Status: He is alert and oriented to person, place, and time.

## 2024-07-23 PROBLEM — F41.9 ANXIETY: Status: ACTIVE | Noted: 2024-07-23

## 2024-07-23 PROBLEM — Z71.89 COUNSELING ON SUBSTANCE USE AND ABUSE: Status: ACTIVE | Noted: 2024-07-23

## 2024-07-23 PROBLEM — M54.9 BACKACHE: Status: ACTIVE | Noted: 2024-07-23

## 2024-07-23 PROBLEM — F43.29 ADJUSTMENT DISORDER WITH MIXED EMOTIONAL FEATURES: Status: ACTIVE | Noted: 2024-07-23

## 2024-10-02 ENCOUNTER — TELEPHONE (OUTPATIENT)
Dept: GASTROENTEROLOGY | Facility: CLINIC | Age: 39
End: 2024-10-02

## 2024-10-02 NOTE — TELEPHONE ENCOUNTER
Patient is scheduled with GI on 10/4/24 - ELIAS Cid, pt suppose to be scheduled with hepatolgy. Scheduled a tentative appnt on 10/30/24 @ 8:30AM with ELIAS Miranda @ [address] [facility] for pt to confirm 10/30/24 appnt.

## 2024-10-04 ENCOUNTER — TELEPHONE (OUTPATIENT)
Age: 39
End: 2024-10-04

## 2024-10-04 ENCOUNTER — TELEPHONE (OUTPATIENT)
Dept: GASTROENTEROLOGY | Facility: CLINIC | Age: 39
End: 2024-10-04

## 2024-10-30 ENCOUNTER — TELEPHONE (OUTPATIENT)
Age: 39
End: 2024-10-30

## 2024-10-30 ENCOUNTER — OFFICE VISIT (OUTPATIENT)
Age: 39
End: 2024-10-30
Payer: COMMERCIAL

## 2024-10-30 VITALS
HEIGHT: 73 IN | TEMPERATURE: 97.4 F | BODY MASS INDEX: 27.04 KG/M2 | SYSTOLIC BLOOD PRESSURE: 130 MMHG | DIASTOLIC BLOOD PRESSURE: 80 MMHG | OXYGEN SATURATION: 97 % | HEART RATE: 72 BPM | WEIGHT: 204 LBS

## 2024-10-30 DIAGNOSIS — B19.20 HEPATITIS C VIRUS INFECTION WITHOUT HEPATIC COMA, UNSPECIFIED CHRONICITY: Primary | ICD-10-CM

## 2024-10-30 DIAGNOSIS — B19.20 HEPATITIS C VIRUS INFECTION WITHOUT HEPATIC COMA, UNSPECIFIED CHRONICITY: ICD-10-CM

## 2024-10-30 PROCEDURE — 99243 OFF/OP CNSLTJ NEW/EST LOW 30: CPT | Performed by: FAMILY MEDICINE

## 2024-10-30 NOTE — Clinical Note
Please proceed with hepatitis C treatment pending the results of patient's labs, ultrasound and ultrasound elastography. Thank you!

## 2024-10-30 NOTE — TELEPHONE ENCOUNTER
----- Message from Payal Miranda PA-C sent at 10/30/2024  9:10 AM EDT -----  Please proceed with hepatitis C treatment pending the results of patient's labs, ultrasound and ultrasound elastography. Thank you!
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
## 2024-11-06 ENCOUNTER — HOSPITAL ENCOUNTER (OUTPATIENT)
Dept: RADIOLOGY | Facility: HOSPITAL | Age: 39
Discharge: HOME | End: 2024-11-06
Payer: COMMERCIAL

## 2024-11-06 ENCOUNTER — APPOINTMENT (OUTPATIENT)
Dept: LAB | Facility: CLINIC | Age: 39
End: 2024-11-06
Payer: COMMERCIAL

## 2024-11-06 DIAGNOSIS — Z11.59 NEED FOR HEPATITIS C SCREENING TEST: ICD-10-CM

## 2024-11-06 DIAGNOSIS — B19.20 HEPATITIS C VIRUS INFECTION WITHOUT HEPATIC COMA, UNSPECIFIED CHRONICITY: ICD-10-CM

## 2024-11-06 DIAGNOSIS — F20.9 SCHIZOPHRENIA, UNSPECIFIED TYPE (HCC): ICD-10-CM

## 2024-11-06 DIAGNOSIS — Z11.4 SCREENING FOR HIV (HUMAN IMMUNODEFICIENCY VIRUS): ICD-10-CM

## 2024-11-06 DIAGNOSIS — Z13.220 LIPID SCREENING: ICD-10-CM

## 2024-11-06 LAB
ALBUMIN SERPL BCG-MCNC: 4.3 G/DL (ref 3.5–5)
ALP SERPL-CCNC: 54 U/L (ref 34–104)
ALT SERPL W P-5'-P-CCNC: 18 U/L (ref 7–52)
ANION GAP SERPL CALCULATED.3IONS-SCNC: 7 MMOL/L (ref 4–13)
AST SERPL W P-5'-P-CCNC: 21 U/L (ref 13–39)
BASOPHILS # BLD AUTO: 0.03 THOUSANDS/ÂΜL (ref 0–0.1)
BASOPHILS NFR BLD AUTO: 1 % (ref 0–1)
BILIRUB SERPL-MCNC: 0.8 MG/DL (ref 0.2–1)
BUN SERPL-MCNC: 10 MG/DL (ref 5–25)
CALCIUM SERPL-MCNC: 8.9 MG/DL (ref 8.4–10.2)
CHLORIDE SERPL-SCNC: 109 MMOL/L (ref 96–108)
CHOLEST SERPL-MCNC: 168 MG/DL
CO2 SERPL-SCNC: 23 MMOL/L (ref 21–32)
CREAT SERPL-MCNC: 0.77 MG/DL (ref 0.6–1.3)
EOSINOPHIL # BLD AUTO: 0.18 THOUSAND/ÂΜL (ref 0–0.61)
EOSINOPHIL NFR BLD AUTO: 3 % (ref 0–6)
ERYTHROCYTE [DISTWIDTH] IN BLOOD BY AUTOMATED COUNT: 12.9 % (ref 11.6–15.1)
GFR SERPL CREATININE-BSD FRML MDRD: 115 ML/MIN/1.73SQ M
GLUCOSE P FAST SERPL-MCNC: 91 MG/DL (ref 65–99)
HAV AB SER QL IA: REACTIVE
HBV CORE AB SER QL: NORMAL
HBV SURFACE AB SER-ACNC: 34 MIU/ML
HBV SURFACE AG SER QL: NORMAL
HCT VFR BLD AUTO: 47.3 % (ref 36.5–49.3)
HCV AB SER QL: REACTIVE
HDLC SERPL-MCNC: 83 MG/DL
HGB BLD-MCNC: 15.3 G/DL (ref 12–17)
HIV 1+2 AB+HIV1 P24 AG SERPL QL IA: NORMAL
HIV 2 AB SERPL QL IA: NORMAL
HIV1 AB SERPL QL IA: NORMAL
HIV1 P24 AG SERPL QL IA: NORMAL
IMM GRANULOCYTES # BLD AUTO: 0.01 THOUSAND/UL (ref 0–0.2)
IMM GRANULOCYTES NFR BLD AUTO: 0 % (ref 0–2)
INR PPP: 0.95 (ref 0.85–1.19)
LDLC SERPL CALC-MCNC: 73 MG/DL (ref 0–100)
LYMPHOCYTES # BLD AUTO: 1.4 THOUSANDS/ÂΜL (ref 0.6–4.47)
LYMPHOCYTES NFR BLD AUTO: 25 % (ref 14–44)
MCH RBC QN AUTO: 28.4 PG (ref 26.8–34.3)
MCHC RBC AUTO-ENTMCNC: 32.3 G/DL (ref 31.4–37.4)
MCV RBC AUTO: 88 FL (ref 82–98)
MONOCYTES # BLD AUTO: 0.76 THOUSAND/ÂΜL (ref 0.17–1.22)
MONOCYTES NFR BLD AUTO: 14 % (ref 4–12)
NEUTROPHILS # BLD AUTO: 3.2 THOUSANDS/ÂΜL (ref 1.85–7.62)
NEUTS SEG NFR BLD AUTO: 57 % (ref 43–75)
NONHDLC SERPL-MCNC: 85 MG/DL
NRBC BLD AUTO-RTO: 0 /100 WBCS
PLATELET # BLD AUTO: 237 THOUSANDS/UL (ref 149–390)
PMV BLD AUTO: 11.1 FL (ref 8.9–12.7)
POTASSIUM SERPL-SCNC: 4.3 MMOL/L (ref 3.5–5.3)
PROT SERPL-MCNC: 7.4 G/DL (ref 6.4–8.4)
PROTHROMBIN TIME: 13 SECONDS (ref 12.3–15)
RBC # BLD AUTO: 5.38 MILLION/UL (ref 3.88–5.62)
SODIUM SERPL-SCNC: 139 MMOL/L (ref 135–147)
TRIGL SERPL-MCNC: 58 MG/DL
WBC # BLD AUTO: 5.58 THOUSAND/UL (ref 4.31–10.16)

## 2024-11-06 PROCEDURE — 87521 HEPATITIS C PROBE&RVRS TRNSC: CPT

## 2024-11-06 PROCEDURE — 86706 HEP B SURFACE ANTIBODY: CPT

## 2024-11-06 PROCEDURE — 76981 USE PARENCHYMA: CPT

## 2024-11-06 PROCEDURE — 86704 HEP B CORE ANTIBODY TOTAL: CPT

## 2024-11-06 PROCEDURE — 87522 HEPATITIS C REVRS TRNSCRPJ: CPT

## 2024-11-06 PROCEDURE — 86803 HEPATITIS C AB TEST: CPT

## 2024-11-06 PROCEDURE — 87340 HEPATITIS B SURFACE AG IA: CPT

## 2024-11-06 PROCEDURE — 36415 COLL VENOUS BLD VENIPUNCTURE: CPT

## 2024-11-06 PROCEDURE — 80061 LIPID PANEL: CPT

## 2024-11-06 PROCEDURE — 85025 COMPLETE CBC W/AUTO DIFF WBC: CPT

## 2024-11-06 PROCEDURE — 86708 HEPATITIS A ANTIBODY: CPT

## 2024-11-06 PROCEDURE — 87389 HIV-1 AG W/HIV-1&-2 AB AG IA: CPT

## 2024-11-06 PROCEDURE — 87902 NFCT AGT GNTYP ALYS HEP C: CPT

## 2024-11-06 PROCEDURE — 80053 COMPREHEN METABOLIC PANEL: CPT

## 2024-11-06 PROCEDURE — 76700 US EXAM ABDOM COMPLETE: CPT

## 2024-11-06 PROCEDURE — 85610 PROTHROMBIN TIME: CPT

## 2024-11-07 LAB
HCV RNA SERPL NAA+PROBE-ACNC: ABNORMAL IU/ML
HCV RNA SERPL NAA+PROBE-ACNC: DETECTED K[IU]/ML

## 2024-11-07 NOTE — TELEPHONE ENCOUNTER
Spoke with pt. Education on hep c treatment, PA process initiated. Pt confirms discontinuation of risperidone. Pt agreeable and verbalized understanding of all information. All questions and concerns addressed. Pt confirms new address of [facility] [place] [id number]. Update placed in epic

## 2024-11-08 LAB
A2 MACROGLOB SERPL-MCNC: 120 MG/DL (ref 110–276)
ALT SERPL W P-5'-P-CCNC: 22 IU/L (ref 0–55)
APO A-I SERPL-MCNC: 194 MG/DL (ref 101–178)
BILIRUB SERPL-MCNC: 0.6 MG/DL (ref 0–1.2)
COMMENT: ABNORMAL
FIBROSIS SCORING:: ABNORMAL
FIBROSIS STAGE SERPL QL: ABNORMAL
GGT SERPL-CCNC: 21 IU/L (ref 0–65)
HAPTOGLOB SERPL-MCNC: 79 MG/DL (ref 17–317)
HCV GENTYP SERPL NAA+PROBE: NORMAL
INTERPRETATIONS: ABNORMAL
LIVER FIBR SCORE SERPL CALC.FIBROSURE: 0.05 (ref 0–0.21)
NECROINFLAMM ACTIVITY SCORING:: ABNORMAL
NECROINFLAMMATORY ACT GRADE SERPL QL: ABNORMAL
NECROINFLAMMATORY ACT SCORE SERPL: 0.07 (ref 0–0.17)
REF LAB TEST METHOD: ABNORMAL
SERVICE CMNT-IMP: ABNORMAL

## 2024-12-27 ENCOUNTER — APPOINTMENT (OUTPATIENT)
Dept: LAB | Facility: CLINIC | Age: 39
End: 2024-12-27
Payer: COMMERCIAL

## 2024-12-27 ENCOUNTER — RESULTS FOLLOW-UP (OUTPATIENT)
Age: 39
End: 2024-12-27

## 2024-12-27 DIAGNOSIS — B19.20 HEPATITIS C VIRUS INFECTION WITHOUT HEPATIC COMA, UNSPECIFIED CHRONICITY: ICD-10-CM

## 2024-12-27 DIAGNOSIS — D58.2 ELEVATED HEMOGLOBIN (HCC): Primary | ICD-10-CM

## 2024-12-27 LAB
ALBUMIN SERPL BCG-MCNC: 4.7 G/DL (ref 3.5–5)
ALP SERPL-CCNC: 65 U/L (ref 34–104)
ALT SERPL W P-5'-P-CCNC: 19 U/L (ref 7–52)
ANION GAP SERPL CALCULATED.3IONS-SCNC: 9 MMOL/L (ref 4–13)
AST SERPL W P-5'-P-CCNC: 31 U/L (ref 13–39)
BASOPHILS # BLD MANUAL: 0.08 THOUSAND/UL (ref 0–0.1)
BASOPHILS NFR MAR MANUAL: 1 % (ref 0–1)
BILIRUB SERPL-MCNC: 1.15 MG/DL (ref 0.2–1)
BUN SERPL-MCNC: 13 MG/DL (ref 5–25)
CALCIUM SERPL-MCNC: 9.6 MG/DL (ref 8.4–10.2)
CHLORIDE SERPL-SCNC: 100 MMOL/L (ref 96–108)
CO2 SERPL-SCNC: 26 MMOL/L (ref 21–32)
CREAT SERPL-MCNC: 1.02 MG/DL (ref 0.6–1.3)
EOSINOPHIL # BLD MANUAL: 0.15 THOUSAND/UL (ref 0–0.4)
EOSINOPHIL NFR BLD MANUAL: 2 % (ref 0–6)
ERYTHROCYTE [DISTWIDTH] IN BLOOD BY AUTOMATED COUNT: 12.4 % (ref 11.6–15.1)
GFR SERPL CREATININE-BSD FRML MDRD: 92 ML/MIN/1.73SQ M
GLUCOSE P FAST SERPL-MCNC: 94 MG/DL (ref 65–99)
HCT VFR BLD AUTO: 57.1 % (ref 36.5–49.3)
HGB BLD-MCNC: 19 G/DL (ref 12–17)
LYMPHOCYTES # BLD AUTO: 1.68 THOUSAND/UL (ref 0.6–4.47)
LYMPHOCYTES # BLD AUTO: 17 % (ref 14–44)
MCH RBC QN AUTO: 28.5 PG (ref 26.8–34.3)
MCHC RBC AUTO-ENTMCNC: 33.3 G/DL (ref 31.4–37.4)
MCV RBC AUTO: 86 FL (ref 82–98)
MONOCYTES # BLD AUTO: 1.38 THOUSAND/UL (ref 0–1.22)
MONOCYTES NFR BLD: 18 % (ref 4–12)
NEUTROPHILS # BLD MANUAL: 4.35 THOUSAND/UL (ref 1.85–7.62)
NEUTS BAND NFR BLD MANUAL: 1 % (ref 0–8)
NEUTS SEG NFR BLD AUTO: 56 % (ref 43–75)
PLATELET # BLD AUTO: 267 THOUSANDS/UL (ref 149–390)
PLATELET BLD QL SMEAR: ADEQUATE
PMV BLD AUTO: 11.3 FL (ref 8.9–12.7)
POTASSIUM SERPL-SCNC: 3.7 MMOL/L (ref 3.5–5.3)
PROT SERPL-MCNC: 8.2 G/DL (ref 6.4–8.4)
RBC # BLD AUTO: 6.67 MILLION/UL (ref 3.88–5.62)
RBC MORPH BLD: NORMAL
SODIUM SERPL-SCNC: 135 MMOL/L (ref 135–147)
VARIANT LYMPHS # BLD AUTO: 5 %
WBC # BLD AUTO: 7.64 THOUSAND/UL (ref 4.31–10.16)

## 2024-12-27 PROCEDURE — 85007 BL SMEAR W/DIFF WBC COUNT: CPT

## 2024-12-27 PROCEDURE — 80053 COMPREHEN METABOLIC PANEL: CPT

## 2024-12-27 PROCEDURE — 85027 COMPLETE CBC AUTOMATED: CPT

## 2024-12-31 LAB — HCV RNA SERPL NAA+PROBE-ACNC: NOT DETECTED K[IU]/ML

## 2025-01-23 ENCOUNTER — TELEPHONE (OUTPATIENT)
Age: 40
End: 2025-01-23

## 2025-01-23 NOTE — TELEPHONE ENCOUNTER
Contacted patient off of Medication Management  wait list to verify needs of services in attempts to offer appt. LVM for patient to contact intake dept  in regards to verifying needs of service.       [facility]/ID: [id number]  Attempt #1

## 2025-01-31 ENCOUNTER — APPOINTMENT (OUTPATIENT)
Dept: LAB | Facility: CLINIC | Age: 40
End: 2025-01-31
Payer: COMMERCIAL

## 2025-01-31 DIAGNOSIS — R79.89 ELEVATED SERUM CREATININE: ICD-10-CM

## 2025-01-31 DIAGNOSIS — D58.2 ELEVATED HEMOGLOBIN (HCC): ICD-10-CM

## 2025-01-31 DIAGNOSIS — E80.6 HYPERBILIRUBINEMIA: ICD-10-CM

## 2025-01-31 LAB
ALBUMIN SERPL BCG-MCNC: 4.3 G/DL (ref 3.5–5)
ALP SERPL-CCNC: 61 U/L (ref 34–104)
ALT SERPL W P-5'-P-CCNC: 16 U/L (ref 7–52)
ANION GAP SERPL CALCULATED.3IONS-SCNC: 9 MMOL/L (ref 4–13)
AST SERPL W P-5'-P-CCNC: 25 U/L (ref 13–39)
BASOPHILS # BLD AUTO: 0.05 THOUSANDS/ΜL (ref 0–0.1)
BASOPHILS NFR BLD AUTO: 1 % (ref 0–1)
BILIRUB SERPL-MCNC: 1.08 MG/DL (ref 0.2–1)
BUN SERPL-MCNC: 6 MG/DL (ref 5–25)
CALCIUM SERPL-MCNC: 9.4 MG/DL (ref 8.4–10.2)
CHLORIDE SERPL-SCNC: 103 MMOL/L (ref 96–108)
CO2 SERPL-SCNC: 27 MMOL/L (ref 21–32)
CREAT SERPL-MCNC: 0.71 MG/DL (ref 0.6–1.3)
EOSINOPHIL # BLD AUTO: 0.17 THOUSAND/ΜL (ref 0–0.61)
EOSINOPHIL NFR BLD AUTO: 2 % (ref 0–6)
ERYTHROCYTE [DISTWIDTH] IN BLOOD BY AUTOMATED COUNT: 12.7 % (ref 11.6–15.1)
GFR SERPL CREATININE-BSD FRML MDRD: 119 ML/MIN/1.73SQ M
GLUCOSE P FAST SERPL-MCNC: 100 MG/DL (ref 65–99)
HCT VFR BLD AUTO: 48.5 % (ref 36.5–49.3)
HGB BLD-MCNC: 16.3 G/DL (ref 12–17)
IMM GRANULOCYTES # BLD AUTO: 0.02 THOUSAND/UL (ref 0–0.2)
IMM GRANULOCYTES NFR BLD AUTO: 0 % (ref 0–2)
LYMPHOCYTES # BLD AUTO: 1.58 THOUSANDS/ΜL (ref 0.6–4.47)
LYMPHOCYTES NFR BLD AUTO: 21 % (ref 14–44)
MCH RBC QN AUTO: 28.6 PG (ref 26.8–34.3)
MCHC RBC AUTO-ENTMCNC: 33.6 G/DL (ref 31.4–37.4)
MCV RBC AUTO: 85 FL (ref 82–98)
MONOCYTES # BLD AUTO: 0.74 THOUSAND/ΜL (ref 0.17–1.22)
MONOCYTES NFR BLD AUTO: 10 % (ref 4–12)
NEUTROPHILS # BLD AUTO: 5.12 THOUSANDS/ΜL (ref 1.85–7.62)
NEUTS SEG NFR BLD AUTO: 66 % (ref 43–75)
NRBC BLD AUTO-RTO: 0 /100 WBCS
PLATELET # BLD AUTO: 264 THOUSANDS/UL (ref 149–390)
PMV BLD AUTO: 11 FL (ref 8.9–12.7)
POTASSIUM SERPL-SCNC: 4 MMOL/L (ref 3.5–5.3)
PROT SERPL-MCNC: 7.3 G/DL (ref 6.4–8.4)
RBC # BLD AUTO: 5.7 MILLION/UL (ref 3.88–5.62)
SODIUM SERPL-SCNC: 139 MMOL/L (ref 135–147)
WBC # BLD AUTO: 7.68 THOUSAND/UL (ref 4.31–10.16)

## 2025-01-31 PROCEDURE — 80053 COMPREHEN METABOLIC PANEL: CPT

## 2025-01-31 PROCEDURE — 85025 COMPLETE CBC W/AUTO DIFF WBC: CPT

## 2025-01-31 PROCEDURE — 36415 COLL VENOUS BLD VENIPUNCTURE: CPT

## 2025-02-03 ENCOUNTER — RESULTS FOLLOW-UP (OUTPATIENT)
Age: 40
End: 2025-02-03